# Patient Record
(demographics unavailable — no encounter records)

---

## 2025-07-09 NOTE — HISTORY OF PRESENT ILLNESS
[Continuous Glucose Monitoring] : Continuous Glucose Monitoring: Yes [Diane] : Diane [FreeTextEntry1] : MUNIR TODD is a 65 yo male with past medical history of type 2 DM, HTN, HLD and UMA on CPAP who presents for diabetes management   Patient was diagnosed with diabetes 7 years. At last endocrine visit in Jan 2023, he was advised to continue Trulicity 1.5mg weekly, stopped Januvia and continued on Glipizide 5mg daily. Of note he is allergic to Metformin (right lower stiffness and noted allergy to Lipitor ). He is also on Lovastatin 40mg daily for HLD, Olmesartan 20mg daily and Amlodipine 10mg daily for HTN  He notes his PCP recently prescribed him Ozempic for weight loss but advised him he cannot be on both Trulicity and Ozempic. pt admits he picked up Ozempic from pharmacy but did not use it yet and wanted endocrinology input first.  Patient checks fingerstick glucose 1x/day, Am fasting ranges from 100-120, denies hypoglycemia.   [Finger Stick] : Finger Stick: No [Hypoglycemia] : Patient is not hypoglycemic. [FreeTextEntry2] : 88 [FreeTextEntry3] : 12+0 [FreeTextEntry4] : 0=0 [de-identified] : - [FreeTextEntry5] : 138 [FreeTextEntry6] : 23.5

## 2025-07-09 NOTE — ASSESSMENT
[Diabetes Foot Care] : diabetes foot care [Long Term Vascular Complications] : long term vascular complications of diabetes [Carbohydrate Consistent Diet] : carbohydrate consistent diet [Importance of Diet and Exercise] : importance of diet and exercise to improve glycemic control, achieve weight loss and improve cardiovascular health [Hypoglycemia Management] : hypoglycemia management [Self Monitoring of Blood Glucose] : self monitoring of blood glucose [Injection Technique, Storage, Sharps Disposal] : injection technique, storage, and sharps disposal [Retinopathy Screening] : Patient was referred to ophthalmology for retinopathy screening [Diabetic Medications] : Risks and benefits of diabetic medications were discussed [FreeTextEntry1] : Type 2 DM, stable  POC HgbA1c today is 6.6%, stable at goal BMI 31 today,  Discussed importance of lifestyle modifications including diabetic diet including following a carbohydrate balanced diet, healthy plating, portion control, and minimize snacking. and the importance of incorporating protein in meals. We also discussed appropriate alternative food choices including sugar alternatives. We discussed the importance of incorporating exercise, to increase physical activity at least 30min/day. Discussed should try weight loss with lifestyle modifications first then we can consider Ozempic... has allergy to metformin and Atorvastatin Reviewed clifford 3 sensor tracing, noted TIR is 88%, 12% high and 0% very high.  Continue Trulicity 4.5mg weekly, he wants to hold off Ozempic for now Continue Jardiance 25mg daily Bloodwork was collected in office today, will f/u results accordingly. Continue Rosuvastatin 10mg daily for hyperlipidemia, will check lipid panel, LDL goal is <70mg/dl He is up to date with ophthalmology for annual diabetic screening, no known h/o diabetic retinopathy  Answered all questions today; patient verbalized understanding of the above RTO in 3 months

## 2025-07-09 NOTE — PHYSICAL EXAM
[Alert] : alert [No Acute Distress] : no acute distress [Well Developed] : well developed [Normal Voice/Communication] : normal voice communication [Normal Sclera/Conjunctiva] : normal sclera/conjunctiva [EOMI] : extra ocular movement intact [No Proptosis] : no proptosis [No Lid Lag] : no lid lag [No LAD] : no lymphadenopathy [Supple] : the neck was supple [No Thyroid Nodules] : no palpable thyroid nodules [No Respiratory Distress] : no respiratory distress [No Accessory Muscle Use] : no accessory muscle use [Normal Rate and Effort] : normal respiratory rate and effort [No Stigmata of Cushings Syndrome] : no stigmata of Cushings Syndrome [Normal Gait] : normal gait [Acanthosis Nigricans] : acanthosis nigricans present [Right foot was examined, including] : right foot ~C was examined, including visual inspection with sensory and pulse exams [Left foot was examined, including] : left foot ~C was examined, including visual inspection with sensory and pulse exams [2+] : 2+ in the dorsalis pedis [No Tremors] : no tremors [Normal Sensation on Monofilament Testing] : normal sensation on monofilament testing of lower extremities [Oriented x3] : oriented to person, place, and time [Normal Insight/Judgement] : insight and judgment were intact [Foot Ulcers] : no foot ulcers